# Patient Record
Sex: MALE | Race: WHITE | Employment: OTHER | URBAN - METROPOLITAN AREA
[De-identification: names, ages, dates, MRNs, and addresses within clinical notes are randomized per-mention and may not be internally consistent; named-entity substitution may affect disease eponyms.]

---

## 2020-10-24 ENCOUNTER — HOSPITAL ENCOUNTER (OUTPATIENT)
Facility: CLINIC | Age: 65
Setting detail: OBSERVATION
Discharge: HOME | End: 2020-10-25
Attending: EMERGENCY MEDICINE | Admitting: INTERNAL MEDICINE
Payer: COMMERCIAL

## 2020-10-24 ENCOUNTER — APPOINTMENT (OUTPATIENT)
Dept: CT IMAGING | Facility: CLINIC | Age: 65
End: 2020-10-24
Attending: EMERGENCY MEDICINE
Payer: COMMERCIAL

## 2020-10-24 ENCOUNTER — APPOINTMENT (OUTPATIENT)
Dept: GENERAL RADIOLOGY | Facility: CLINIC | Age: 65
End: 2020-10-24
Attending: EMERGENCY MEDICINE
Payer: COMMERCIAL

## 2020-10-24 DIAGNOSIS — R07.9 CHEST PAIN, UNSPECIFIED TYPE: ICD-10-CM

## 2020-10-24 LAB
ALBUMIN SERPL-MCNC: 3.5 G/DL (ref 3.4–5)
ALP SERPL-CCNC: 64 U/L (ref 40–150)
ALT SERPL W P-5'-P-CCNC: 35 U/L (ref 0–70)
ANION GAP SERPL CALCULATED.3IONS-SCNC: 4 MMOL/L (ref 3–14)
AST SERPL W P-5'-P-CCNC: 22 U/L (ref 0–45)
BASOPHILS # BLD AUTO: 0 10E9/L (ref 0–0.2)
BASOPHILS NFR BLD AUTO: 0.3 %
BILIRUB SERPL-MCNC: 0.5 MG/DL (ref 0.2–1.3)
BUN SERPL-MCNC: 22 MG/DL (ref 7–30)
CALCIUM SERPL-MCNC: 9.1 MG/DL (ref 8.5–10.1)
CHLORIDE SERPL-SCNC: 109 MMOL/L (ref 94–109)
CO2 SERPL-SCNC: 27 MMOL/L (ref 20–32)
CREAT SERPL-MCNC: 1.04 MG/DL (ref 0.66–1.25)
DIFFERENTIAL METHOD BLD: NORMAL
EOSINOPHIL # BLD AUTO: 0 10E9/L (ref 0–0.7)
EOSINOPHIL NFR BLD AUTO: 0.4 %
ERYTHROCYTE [DISTWIDTH] IN BLOOD BY AUTOMATED COUNT: 11.7 % (ref 10–15)
GFR SERPL CREATININE-BSD FRML MDRD: 74 ML/MIN/{1.73_M2}
GLUCOSE SERPL-MCNC: 95 MG/DL (ref 70–99)
HCT VFR BLD AUTO: 45.4 % (ref 40–53)
HGB BLD-MCNC: 14.9 G/DL (ref 13.3–17.7)
IMM GRANULOCYTES # BLD: 0 10E9/L (ref 0–0.4)
IMM GRANULOCYTES NFR BLD: 0.3 %
INTERPRETATION ECG - MUSE: NORMAL
LABORATORY COMMENT REPORT: NORMAL
LIPASE SERPL-CCNC: 91 U/L (ref 73–393)
LYMPHOCYTES # BLD AUTO: 1.1 10E9/L (ref 0.8–5.3)
LYMPHOCYTES NFR BLD AUTO: 15.1 %
MCH RBC QN AUTO: 31.6 PG (ref 26.5–33)
MCHC RBC AUTO-ENTMCNC: 32.8 G/DL (ref 31.5–36.5)
MCV RBC AUTO: 96 FL (ref 78–100)
MONOCYTES # BLD AUTO: 0.5 10E9/L (ref 0–1.3)
MONOCYTES NFR BLD AUTO: 6.9 %
NEUTROPHILS # BLD AUTO: 5.6 10E9/L (ref 1.6–8.3)
NEUTROPHILS NFR BLD AUTO: 77 %
NRBC # BLD AUTO: 0 10*3/UL
NRBC BLD AUTO-RTO: 0 /100
PLATELET # BLD AUTO: 243 10E9/L (ref 150–450)
POTASSIUM SERPL-SCNC: 4.3 MMOL/L (ref 3.4–5.3)
PROT SERPL-MCNC: 7.1 G/DL (ref 6.8–8.8)
RBC # BLD AUTO: 4.72 10E12/L (ref 4.4–5.9)
SARS-COV-2 RNA SPEC QL NAA+PROBE: NEGATIVE
SARS-COV-2 RNA SPEC QL NAA+PROBE: NORMAL
SODIUM SERPL-SCNC: 140 MMOL/L (ref 133–144)
SPECIMEN SOURCE: NORMAL
SPECIMEN SOURCE: NORMAL
TROPONIN I SERPL-MCNC: 0.02 UG/L (ref 0–0.04)
TROPONIN I SERPL-MCNC: 0.02 UG/L (ref 0–0.04)
WBC # BLD AUTO: 7.3 10E9/L (ref 4–11)

## 2020-10-24 PROCEDURE — G0378 HOSPITAL OBSERVATION PER HR: HCPCS

## 2020-10-24 PROCEDURE — 71046 X-RAY EXAM CHEST 2 VIEWS: CPT

## 2020-10-24 PROCEDURE — C9803 HOPD COVID-19 SPEC COLLECT: HCPCS

## 2020-10-24 PROCEDURE — 99220 PR INITIAL OBSERVATION CARE,LEVEL III: CPT | Performed by: PHYSICIAN ASSISTANT

## 2020-10-24 PROCEDURE — 80053 COMPREHEN METABOLIC PANEL: CPT | Performed by: EMERGENCY MEDICINE

## 2020-10-24 PROCEDURE — 250N000013 HC RX MED GY IP 250 OP 250 PS 637: Performed by: EMERGENCY MEDICINE

## 2020-10-24 PROCEDURE — 83690 ASSAY OF LIPASE: CPT | Performed by: EMERGENCY MEDICINE

## 2020-10-24 PROCEDURE — 71260 CT THORAX DX C+: CPT

## 2020-10-24 PROCEDURE — 36415 COLL VENOUS BLD VENIPUNCTURE: CPT | Performed by: PHYSICIAN ASSISTANT

## 2020-10-24 PROCEDURE — U0003 INFECTIOUS AGENT DETECTION BY NUCLEIC ACID (DNA OR RNA); SEVERE ACUTE RESPIRATORY SYNDROME CORONAVIRUS 2 (SARS-COV-2) (CORONAVIRUS DISEASE [COVID-19]), AMPLIFIED PROBE TECHNIQUE, MAKING USE OF HIGH THROUGHPUT TECHNOLOGIES AS DESCRIBED BY CMS-2020-01-R: HCPCS | Performed by: EMERGENCY MEDICINE

## 2020-10-24 PROCEDURE — 99285 EMERGENCY DEPT VISIT HI MDM: CPT | Mod: 25

## 2020-10-24 PROCEDURE — 84484 ASSAY OF TROPONIN QUANT: CPT | Performed by: EMERGENCY MEDICINE

## 2020-10-24 PROCEDURE — 250N000011 HC RX IP 250 OP 636: Performed by: EMERGENCY MEDICINE

## 2020-10-24 PROCEDURE — 85025 COMPLETE CBC W/AUTO DIFF WBC: CPT | Performed by: EMERGENCY MEDICINE

## 2020-10-24 PROCEDURE — 93005 ELECTROCARDIOGRAM TRACING: CPT

## 2020-10-24 RX ORDER — ALUMINA, MAGNESIA, AND SIMETHICONE 2400; 2400; 240 MG/30ML; MG/30ML; MG/30ML
30 SUSPENSION ORAL EVERY 4 HOURS PRN
Status: DISCONTINUED | OUTPATIENT
Start: 2020-10-24 | End: 2020-10-25 | Stop reason: HOSPADM

## 2020-10-24 RX ORDER — OXYCODONE HYDROCHLORIDE 5 MG/1
5 TABLET ORAL EVERY 4 HOURS PRN
Status: DISCONTINUED | OUTPATIENT
Start: 2020-10-24 | End: 2020-10-25 | Stop reason: HOSPADM

## 2020-10-24 RX ORDER — LIDOCAINE 40 MG/G
CREAM TOPICAL
Status: DISCONTINUED | OUTPATIENT
Start: 2020-10-24 | End: 2020-10-25 | Stop reason: HOSPADM

## 2020-10-24 RX ORDER — NITROGLYCERIN 0.4 MG/1
0.4 TABLET SUBLINGUAL EVERY 5 MIN PRN
Status: DISCONTINUED | OUTPATIENT
Start: 2020-10-24 | End: 2020-10-25 | Stop reason: HOSPADM

## 2020-10-24 RX ORDER — IOPAMIDOL 755 MG/ML
80 INJECTION, SOLUTION INTRAVASCULAR ONCE
Status: COMPLETED | OUTPATIENT
Start: 2020-10-24 | End: 2020-10-24

## 2020-10-24 RX ORDER — HYDRALAZINE HYDROCHLORIDE 20 MG/ML
10 INJECTION INTRAMUSCULAR; INTRAVENOUS EVERY 6 HOURS PRN
Status: DISCONTINUED | OUTPATIENT
Start: 2020-10-24 | End: 2020-10-24

## 2020-10-24 RX ORDER — NALOXONE HYDROCHLORIDE 0.4 MG/ML
.1-.4 INJECTION, SOLUTION INTRAMUSCULAR; INTRAVENOUS; SUBCUTANEOUS
Status: DISCONTINUED | OUTPATIENT
Start: 2020-10-24 | End: 2020-10-25 | Stop reason: HOSPADM

## 2020-10-24 RX ORDER — METOPROLOL TARTRATE 1 MG/ML
5 INJECTION, SOLUTION INTRAVENOUS ONCE
Status: DISCONTINUED | OUTPATIENT
Start: 2020-10-24 | End: 2020-10-24

## 2020-10-24 RX ORDER — ASPIRIN 81 MG/1
81 TABLET ORAL DAILY
Status: DISCONTINUED | OUTPATIENT
Start: 2020-10-25 | End: 2020-10-25 | Stop reason: HOSPADM

## 2020-10-24 RX ORDER — NITROGLYCERIN 0.4 MG/1
0.4 TABLET SUBLINGUAL EVERY 5 MIN PRN
Status: DISCONTINUED | OUTPATIENT
Start: 2020-10-24 | End: 2020-10-24

## 2020-10-24 RX ORDER — ACETAMINOPHEN 325 MG/1
650 TABLET ORAL EVERY 4 HOURS PRN
Status: DISCONTINUED | OUTPATIENT
Start: 2020-10-24 | End: 2020-10-25 | Stop reason: HOSPADM

## 2020-10-24 RX ORDER — HYDRALAZINE HYDROCHLORIDE 20 MG/ML
5-10 INJECTION INTRAMUSCULAR; INTRAVENOUS EVERY 6 HOURS PRN
Status: DISCONTINUED | OUTPATIENT
Start: 2020-10-24 | End: 2020-10-25 | Stop reason: HOSPADM

## 2020-10-24 RX ORDER — ASPIRIN 81 MG/1
162 TABLET, CHEWABLE ORAL ONCE
Status: COMPLETED | OUTPATIENT
Start: 2020-10-24 | End: 2020-10-24

## 2020-10-24 RX ADMIN — IOPAMIDOL 80 ML: 755 INJECTION, SOLUTION INTRAVENOUS at 16:17

## 2020-10-24 RX ADMIN — NITROGLYCERIN 0.4 MG: 0.4 TABLET, ORALLY DISINTEGRATING SUBLINGUAL at 15:39

## 2020-10-24 RX ADMIN — ASPIRIN 162 MG: 81 TABLET, CHEWABLE ORAL at 18:57

## 2020-10-24 ASSESSMENT — ENCOUNTER SYMPTOMS
DIAPHORESIS: 0
NAUSEA: 0
VOMITING: 0

## 2020-10-24 NOTE — H&P
[facility]  Observation Unit  H & P      Patient Name: Candido Pierre MRN# [id number]   Age: 65 year old YOB: 1955     Date of Admission:10/24/2020    Primary care provider: Esvin Vazquez  Date of Service: 10/24/2020         Assessment and Plan:   Candido Pierre is a 65 year old male with a history of Heart Murmur, Skin Cancer, BPH, HLD who presented to the ED today 2/2 chest pain.    Chest Pain - pt reports the onset of left sided chest pressure with radiation around to his back around 10am after lifting a 40-50lb deer stand.  No associated shortness of breath or diaphoresis.  Pain now sore and reproducbile with twisting and stretching the left arm.  Troponin detectable on arrival and repeated with no significant change.  EKG non ischemic.  CT Aortic Survey with minimal nonaneurysmal atherosclerosis is seen in the distal abdominal aorta and in the iliac arteries. No evidence for aortic artery aneurysm or dissection. No acute fracture, pleural effusion, pneumothorax, or pneumonia is seen.  Patient admitted for ACS rule out.  He will undergo stress testing on 10/25.  If stress testing negative, consider likely MSK strain.  - serial troponin levels  - telemetry monitoring  - exercise stress echocardiogram 10/25    HTN - bp elevated on arrival with improvement after sl nitroglycerin.  Not on medications pta.  - monitor trend  - prn Hydralazine    Hyperlipidemia - last lipid panel (2/2020) Tchol 214, HDL 43, , Trig 103.  Not currently on medications.    BPH - continue pta Flomax.  Enlarged prostate noted on CT    Inguinal Hernia - incidentally noted on CT imaging.    Cholelithiasis - incidentally noted on CT imaging      CODE: Full  Diet/IVF: cardiac diet without caffeine  DVT ppx: ambulation    Regina Alvarenga MS PA-C  Physician Assistant   Hospitalist Service  Pager: [phone]    Awaiting formal pharmacy med rec to confirm home medications         Chief Complaint:   Left  sided chest pressure         HPI:   65 year old male with a history of Heart Murmur, Skin Cancer, BPH, HLD who presented to the ED today 2/2 chest pain.    Patient reports he was in his usual state of health.  Around 10am this morning, he was lifting a 40-50 pound deer stand over his head.  He felt a left sided pressure with radiation around to his back and immediate burping/gas production.  The burping resolved, but his chest pressure persisted.  He denied any radiation to his jaw or down his arm, shortness of breath or diaphoresis.  Pain is currently improved with sitting still and worse with a twisting movement and stretching his left arm.  He denies any hx of HTN, CAD.  He does have a family hx of CAD in his brother.  He denies cough, fever.    ED work up revealed patient hypertensive, afebrile, 98% on room air.  Laboratory work up revealed CMP, Lipase, Troponin, CBC wnl.  EKG revealed NSR.  CXR Prominence of the descending thoracic aorta which prompted a CT Aortic Survey which revealed Minimal nonaneurysmal atherosclerosis is seen in the distal abdominal aorta and in the iliac arteries. No evidence for aortic artery aneurysm or dissection.   Etiology for patient's chest pain is not definitely seen. No acute fracture is identified. No evidence for pleural effusion, pneumothorax, or pneumonia is seen. Small bilateral fat-containing inguinal hernias, slightly larger on the left.  Enlarged prostate gland.  Cholelithiasis without evidence for acute cholecystitis. Patient received sl nitroglycerin, ASA and admitted for further management.         Past Medical History:   Heart Murmur, Skin Cancer, BPH, HLD        Past Surgical History:   Left index finger amputation  Big toe IP joint fusion  Left eye surgery   Hernia repair  Anal fissure repair       Social History:     Social History     Socioeconomic History     Marital status:      Spouse name: Not on file     Number of children: Not on file     Years of  education: Not on file     Highest education level: Not on file   Occupational History     Not on file   Social Needs     Financial resource strain: Not on file     Food insecurity     Worry: Not on file     Inability: Not on file     Transportation needs     Medical: Not on file     Non-medical: Not on file   Tobacco Use     Smoking status: Not on file   Substance and Sexual Activity     Alcohol use: Not on file     Drug use: Not on file     Sexual activity: Not on file   Lifestyle     Physical activity     Days per week: Not on file     Minutes per session: Not on file     Stress: Not on file   Relationships     Social connections     Talks on phone: Not on file     Gets together: Not on file     Attends Voodoo service: Not on file     Active member of club or organization: Not on file     Attends meetings of clubs or organizations: Not on file     Relationship status: Not on file     Intimate partner violence     Fear of current or ex partner: Not on file     Emotionally abused: Not on file     Physically abused: Not on file     Forced sexual activity: Not on file   Other Topics Concern     Not on file   Social History Narrative     Not on file          Family History:   Pancreatitic cancer  Hypertension  Breast cancer  Thyroid disease       Allergies:    No Known Allergies       Medications:   none       Review of Systems:   A complete ROS was performed and is negative other than what is stated in the HPI.       Physical Exam:   Blood pressure (!) 139/90, pulse 58, temperature 98.8  F (37.1  C), temperature source Oral, resp. rate 15, weight 86.2 kg (190 lb), SpO2 96 %. on room air  General: Alert, interactive, NAD, lying in bed, pleasant and cooperative, watching television  HEENT: AT/NC, sclera anicteric  Chest/Resp: clear to auscultation bilaterally, no crackles or wheezes  Heart/CV: regular rate and rhythm, no murmur  Abdomen/GI: Soft, nontender, nondistended. +BS.  No rebound or  guarding.  Extremities/MSK: No LE edema  Skin: Warm and dry  Neuro: Alert & oriented x 3, no focal deficits, moves all extremities equally         Labs:   ROUTINE ICU LABS (Last four results)  CMP  Recent Labs   Lab 10/24/20  1537      POTASSIUM 4.3   CHLORIDE 109   CO2 27   ANIONGAP 4   GLC 95   BUN 22   CR 1.04   GFRESTIMATED 74   GFRESTBLACK 86   JOANN 9.1   PROTTOTAL 7.1   ALBUMIN 3.5   BILITOTAL 0.5   ALKPHOS 64   AST 22   ALT 35     CBC  Recent Labs   Lab 10/24/20  1537   WBC 7.3   RBC 4.72   HGB 14.9   HCT 45.4   MCV 96   MCH 31.6   MCHC 32.8   RDW 11.7        INRNo lab results found in last 7 days.  Arterial Blood GasNo lab results found in last 7 days.       Imaging/Procedures:     Results for orders placed or performed during the hospital encounter of 10/24/20   XR Chest 2 Views    Narrative    CHEST TWO VIEWS    10/24/2020 4:00 PM     HISTORY: Chest pain    COMPARISON: None.    FINDINGS:  The descending thoracic aorta appears prominent. Ectasia or  dissection is not excluded in the appropriate clinical setting. If  there is clinical concern for dissection, further evaluation with CT  chest would be recommended. Lungs are clear. Heart size, mediastinum  and pulmonary vascularity are otherwise within normal limits. No  pneumothorax, significant pleural fluid collection, or acute osseous  fracture.      Impression    IMPRESSION:   1. Prominence of the descending thoracic aorta could represent a  normal artifact in this case, but could also represent ectasia,  aneurysmal dilatation or dissection in the appropriate clinical  setting. Further evaluation with CT chest is recommended if there is  clinical concern for these etiologies.  2. No other evidence of acute cardiopulmonary disease is seen.    I discussed the prominent aorta with Dr. Ríos on 10/24/2020 at 4:07  PM.   CT Aortic Survey w Contrast    Narrative    CT AORTIC SURVEY WITH CONTRAST  10/24/2020 4:23 PM    HISTORY: Concern for  dissection, chest pain sudden onset.    TECHNIQUE: Scans obtained of the chest, abdomen, and pelvis with IV  contrast. 80mL Isovue-370 injected and images were obtained in the  arterial phase of contrast. Radiation dose for this scan was reduced  using automated exposure control, adjustment of the mA and/or kV  according to patient size, or iterative reconstruction technique.    COMPARISON: Chest x-rays dated 10/24/2020.    FINDINGS: The aorta is of normal caliber and demonstrates no evidence  for dissection or aneurysm. It measures up to 2.8 cm in cross-section  at the proximal descending thoracic portion, 3.6 cm in cross-sectional  dimension in the ascending portion and 2.2 cm in cross-section in the  abdominal portion. Mild atherosclerotic plaquing is seen in the  abdominal aorta and in the iliac arteries.    Chest: The lungs are clear without significant nodule, mass,  infiltrate, effusion, or pneumothorax. Minimal dependent atelectasis  is seen in the posterior right lung.    Heart is normal in size. No mediastinal, hilar, or axillary  lymphadenopathy is identified. There are no filling defects in the  central pulmonary arteries to suggest pulmonary artery embolism. The  study was not optimized for pulmonary artery evaluation. Visualized  portions of the thyroid are unremarkable.    No aggressive osseous lesions or acute osseous fractures are seen.  Hemangioma is noted in the L1 vertebral body on the left (a benign  finding). There is fusion of the anterior aspects of the bilateral SI  joints. Benign bone island is seen in the anterior left ilium adjacent  to the acetabulum. There are degenerative changes in the bilateral  hips.    Abdomen and pelvis:  The liver, gallbladder, pancreas, spleen,  bilateral adrenal glands enhance normally. Prominent left parapelvic  cyst is noted. Cortex of the posterior right kidney is also noted.  Kidneys otherwise enhance normally. No hydronephrosis,  nephrolithiasis,  hydroureter, or ureteral calculus is identified.  Urinary bladder is normal in appearance.    No adenopathy, free fluid or free air is seen in the peritoneal  cavity.    The colon is grossly of normal caliber. No pericolonic inflammatory  change to suggest acute diverticulitis. Multiple diverticula are noted  in the descending and sigmoid colon. Appendix is normal in appearance  and extends inferiorly from the cecum. Small bowel is grossly of  normal caliber and appearance. Stomach has a thickened wall which is  likely due to incomplete distention. Small amount of fluid and air are  seen in the stomach.    There are small fat-containing bilateral inguinal hernias, slightly  larger on the left. Prostate gland is enlarged and contains some  dystrophic calcifications, and is otherwise unremarkable      Impression    IMPRESSION:  1. Minimal nonaneurysmal atherosclerosis is seen in the distal  abdominal aorta and in the iliac arteries. No evidence for aortic  artery aneurysm or dissection.   2. Etiology for patient's chest pain is not definitely seen. No acute  fracture is identified. No evidence for pleural effusion,  pneumothorax, or pneumonia is seen.  3. Small bilateral fat-containing inguinal hernias, slightly larger on  the left.  4. Enlarged prostate gland.  5. Cholelithiasis without evidence for acute cholecystitis.

## 2020-10-24 NOTE — ED NOTES
[facility]  ED Nurse Handoff Report    Candido Pierre is a 65 year old male   ED Chief complaint: Chest Pain  . ED Diagnosis:   Final diagnoses:   None     Allergies: No Known Allergies    Code Status: Full Code  Activity level - Baseline/Home:  Independent. Activity Level - Current:   Stand by Assist. Lift room needed: No. Bariatric: No   Needed: No   Isolation: No. Infection: Not Applicable.     Vital Signs:   Vitals:    10/24/20 1630 10/24/20 1645 10/24/20 1700 10/24/20 1715   BP: (!) 148/88 (!) 144/88 (!) 139/90    Pulse: 55 60 59 58   Resp: 11 16 16 15   Temp:       TempSrc:       SpO2: 96% 96% 96% 96%   Weight:           Cardiac Rhythm:  ,   Cardiac  Cardiac Rhythm: Normal sinus rhythm  Pain level: 0-10 Pain Scale: 2  Patient confused: No. Patient Falls Risk: Yes.   Elimination Status: Has voided   Patient Report - Initial Complaint: Chest Pain. Focused Assessment: Candido Pierre is a 65 year old male with a history of systolic murmur who presents with chest pain. The patient reports around 1000 this morning he developed pain near his heart after lifting a 50 pound deer stand. His pain radiates to his back and is still present in the ED, though it is less severe and he rates it as a 2/10. He denies any associated nausea or vomiting and any diaphoresis prior to the episode. He denies any previous episodes of chest pain and any history of heart issues. Of note, his brother has a history of heart stents.   Tests Performed: Labs, CT Abnormal Results: Abnormal Labs Reviewed - No abnormal labs to display  Treatments provided: see EMAR  Family Comments: Wife at Bedside, [phone], would like to be kept up to date  OBS brochure/video discussed/provided to patient:  Yes  ED Medications:   Medications   nitroGLYcerin (NITROSTAT) sublingual tablet 0.4 mg (0.4 mg Sublingual Given 10/24/20 1539)   iopamidol (ISOVUE-370) solution 80 mL (80 mLs Intravenous Given 10/24/20 1617)   sodium  chloride (PF) 0.9% PF flush 60 mL (60 mLs Intravenous Given 10/24/20 1175)     Drips infusing:  No  For the majority of the shift, the patient's behavior Green. Interventions performed were NA.    Sepsis treatment initiated: No     Patient tested for COVID 19 prior to admission: YES    ED Nurse Name/Phone Number: Gail Arteaga RN,   5:24 PM    RECEIVING UNIT ED HANDOFF REVIEW    Above ED Nurse Handoff Report was reviewed: Yes  Reviewed by: Blanca Saul on October 24, 2020 at 7:21 PM

## 2020-10-24 NOTE — ED PROVIDER NOTES
History     Chief Complaint:  Chest Pain    HPI   Candido Pierre is a 65 year old male with a history of systolic murmur who presents with chest pain. The patient reports around 1000 this morning he developed pain near his heart after lifting a 50 pound deer stand. His pain radiates to his back and is still present in the ED, though it is less severe and he rates it as a 2/10. He denies any associated nausea or vomiting and any diaphoresis prior to the episode. He denies any previous episodes of chest pain and any history of heart issues. Of note, his brother has a history of heart stents.     Allergies:  No Known Drug Allergies     Medications:    The patient is not currently taking any prescribed medications.    Past Medical History:    Basal cell carcinoma  BPH  Dyslipidemia  Systolic murmur     Past Surgical History:    Left index finger amputation  Big toe IP joint fusion  Left eye surgery   Hernia repair  Anal fissure repair    Family History:    Pancreatitic cancer  Hypertension  Breast cancer  Thyroid disease    Social History:  Negative for tobacco use.  Positive for alcohol use.   Negative for drug use.  Marital Status:       Review of Systems   Constitutional: Negative for diaphoresis.   Cardiovascular: Positive for chest pain.   Gastrointestinal: Negative for nausea and vomiting.   All other systems reviewed and are negative.      Physical Exam     Patient Vitals for the past 24 hrs:   BP Temp Temp src Pulse Resp SpO2 Weight   10/24/20 1715 -- -- -- 58 15 96 % --   10/24/20 1700 (!) 139/90 -- -- 59 16 96 % --   10/24/20 1645 (!) 144/88 -- -- 60 16 96 % --   10/24/20 1630 (!) 148/88 -- -- 55 11 96 % --   10/24/20 1545 (!) 159/72 -- -- 71 8 96 % --   10/24/20 1530 (!) 173/104 -- -- -- -- -- --   10/24/20 1437 (!) 209/115 98.8  F (37.1  C) Oral 62 16 98 % 86.2 kg (190 lb)     Physical Exam    Constitutional: Alert  HENT:    Nose: Nose normal.    Mouth/Throat: Oropharynx is clear, mucous membranes  are moist  Eyes: EOM are normal. Pupils are equal, round, and reactive to light.   CV: Regular rate and rhythm, no murmurs, rubs or gallops.  Resp: Clear lungs to auscultation, all lung fields. Normal respiratory effort.   GI: Soft, non-distended. There is no tenderness. No rebound or guarding.   MSK: Normal range of motion. No deformity.   Neurological:   A/Ox3  5/5 strength is symmetric to the upper and lower extremities;   Sensation intact to light touch throughout the upper and lower extremities;   Skin: Skin is warm and dry.     Emergency Department Course     ECG:  ECG taken at 1446, ECG read at 1448  Normal sinus rhythm.  Minimal voltage criteria for LVH, may be normal variant.   Borderline ECG.  No ST elevation or depression   No T wave inversion   No evidence of HOCM, ARVD, Brugada, WPW, AV block, prolonged QT.  Rate 63 bpm. OK interval 176 ms. QRS duration 100 ms. QT/QTc 402/411 ms. P-R-T axes 15 10 32.      Imaging:  Radiology findings were communicated with the patient who voiced understanding of the findings.    XR Chest 2 Views:  1. Prominence of the descending thoracic aorta could represent a  normal artifact in this case, but could also represent ectasia,  aneurysmal dilatation or dissection in the appropriate clinical  setting. Further evaluation with CT chest is recommended if there is  clinical concern for these etiologies.  2. No other evidence of acute cardiopulmonary disease is seen.  As read by Radiology.     CT Aortic Survey w contrast   1. Minimal nonaneurysmal atherosclerosis is seen in the distal  abdominal aorta and in the iliac arteries. No evidence for aortic  artery aneurysm or dissection.   2. Etiology for patient's chest pain is not definitely seen. No acute  fracture is identified. No evidence for pleural effusion,  pneumothorax, or pneumonia is seen.  3. Small bilateral fat-containing inguinal hernias, slightly larger on  the left.  4. Enlarged prostate gland.  5. Cholelithiasis  without evidence for acute cholecystitis.  Reading per radiology     Laboratory:  Laboratory findings were communicated with the patient who voiced understanding of the findings.    CBC: WNL (WBC 7.3, HGB 14.9, )  CMP: WNL (Creatinine: 1.04)    Troponin (Collected 1537): 0.016   Troponin: 0.019   Lipase: 91     Asymptomatic COVID-19 Virus by PCR: pending      Interventions:  1539 Nitrostat 0.4 mg Sublingual     Emergency Department Course:  Past medical records, nursing notes, and vitals reviewed.    1448 EKG obtained in the ED, see results above.     1510 I performed an exam of the patient as documented above.     1537 IV was inserted and blood was drawn for laboratory testing, results above.    1600 The patient was sent for a XR chest and CT aortic survey while in the emergency department, results above.     1607 I spoke to Dr. Lyman of radiology regarding the patient's case     1630 I rechecked the patient and discussed the results of the ED workup thus far.     1716 I rechecked the patient. Explained findings to patient.     1731 I spoke to Regina Alvarenga PA-C of the hospitalist service who accepts care of the patient.      Findings and plan explained to the Patient who consents to admission. Discussed the patient with Dr. Lowery, who will admit the patient to an observation bed for further monitoring, evaluation, and treatment.    Impression & Plan     Covid-19  Candido Pierre was evaluated during a global COVID-19 pandemic, which necessitated consideration that the patient might be at risk for infection with the SARS-CoV-2 virus that causes COVID-19.   Applicable protocols for evaluation were followed during the patient's care.   COVID-19 was considered as part of the patient's evaluation. The plan for testing is:  a test was obtained during this visit.       Medical Decision Makin-year-old male who comes in with chest pain.  Exam as above.  Labs and EKG and chest x-ray were obtained.  Chest  x-ray showed prominence of the aorta.  CT aorta was obtained which showed a normal aorta.  EKG was nonischemic.  Troponin was not 0 but was also not positive.  Repeat troponin was flat.  Patient will come into the hospital for serial enzymes, EKGs, and stress testing in the morning.    Diagnosis:    ICD-10-CM    1. Chest pain, unspecified type  R07.9 Troponin I (now)     Disposition:  Admitted to Dr. Lowery.    Scribe Disclosure:  I, Brittany Richter, am serving as a scribe at 3:07 PM on 10/24/2020 to document services personally performed by Shawn Ríos DO based on my observations and the provider's statements to me.        Shawn Ríos DO  10/24/20 [id number]

## 2020-10-24 NOTE — ED TRIAGE NOTES
"Patient reports he was lifting a 50# deer stand this morning at about 10 am, developed left chest pain \"pressure\" afterward. It now feels \"like a strain\", worse with deep breath and movement. He also reports burping. No change with walking up steps. Denies nausea, vomiting, radiation of pain, numbness or tingling.   "

## 2020-10-25 ENCOUNTER — APPOINTMENT (OUTPATIENT)
Dept: CARDIOLOGY | Facility: CLINIC | Age: 65
End: 2020-10-25
Attending: PHYSICIAN ASSISTANT
Payer: COMMERCIAL

## 2020-10-25 VITALS
HEART RATE: 64 BPM | DIASTOLIC BLOOD PRESSURE: 95 MMHG | TEMPERATURE: 98.1 F | SYSTOLIC BLOOD PRESSURE: 140 MMHG | WEIGHT: 190 LBS | OXYGEN SATURATION: 96 % | RESPIRATION RATE: 16 BRPM

## 2020-10-25 LAB
TROPONIN I SERPL-MCNC: <0.015 UG/L (ref 0–0.04)
TROPONIN I SERPL-MCNC: <0.015 UG/L (ref 0–0.04)

## 2020-10-25 PROCEDURE — 36415 COLL VENOUS BLD VENIPUNCTURE: CPT | Performed by: PHYSICIAN ASSISTANT

## 2020-10-25 PROCEDURE — 93017 CV STRESS TEST TRACING ONLY: CPT

## 2020-10-25 PROCEDURE — G0008 ADMIN INFLUENZA VIRUS VAC: HCPCS | Performed by: PHYSICIAN ASSISTANT

## 2020-10-25 PROCEDURE — 93018 CV STRESS TEST I&R ONLY: CPT | Performed by: INTERNAL MEDICINE

## 2020-10-25 PROCEDURE — 250N000011 HC RX IP 250 OP 636: Performed by: PHYSICIAN ASSISTANT

## 2020-10-25 PROCEDURE — 250N000013 HC RX MED GY IP 250 OP 250 PS 637: Performed by: PHYSICIAN ASSISTANT

## 2020-10-25 PROCEDURE — 90662 IIV NO PRSV INCREASED AG IM: CPT | Performed by: PHYSICIAN ASSISTANT

## 2020-10-25 PROCEDURE — 93321 DOPPLER ECHO F-UP/LMTD STD: CPT | Mod: 26 | Performed by: INTERNAL MEDICINE

## 2020-10-25 PROCEDURE — 93016 CV STRESS TEST SUPVJ ONLY: CPT | Performed by: INTERNAL MEDICINE

## 2020-10-25 PROCEDURE — 93325 DOPPLER ECHO COLOR FLOW MAPG: CPT | Mod: 26 | Performed by: INTERNAL MEDICINE

## 2020-10-25 PROCEDURE — G0378 HOSPITAL OBSERVATION PER HR: HCPCS

## 2020-10-25 PROCEDURE — 99217 PR OBSERVATION CARE DISCHARGE: CPT | Performed by: PHYSICIAN ASSISTANT

## 2020-10-25 PROCEDURE — 84484 ASSAY OF TROPONIN QUANT: CPT | Performed by: PHYSICIAN ASSISTANT

## 2020-10-25 PROCEDURE — 93350 STRESS TTE ONLY: CPT | Mod: 26 | Performed by: INTERNAL MEDICINE

## 2020-10-25 RX ADMIN — INFLUENZA A VIRUS A/MICHIGAN/45/2015 X-275 (H1N1) ANTIGEN (FORMALDEHYDE INACTIVATED), INFLUENZA A VIRUS A/SINGAPORE/INFIMH-16-0019/2016 IVR-186 (H3N2) ANTIGEN (FORMALDEHYDE INACTIVATED), INFLUENZA B VIRUS B/PHUKET/3073/2013 ANTIGEN (FORMALDEHYDE INACTIVATED), AND INFLUENZA B VIRUS B/MARYLAND/15/2016 BX-69A ANTIGEN (FORMALDEHYDE INACTIVATED) 0.7 ML: 60; 60; 60; 60 INJECTION, SUSPENSION INTRAMUSCULAR at 10:38

## 2020-10-25 RX ADMIN — ASPIRIN 81 MG: 81 TABLET, COATED ORAL at 08:17

## 2020-10-25 NOTE — PLAN OF CARE
PRIMARY DIAGNOSIS: CHEST PAIN  OUTPATIENT/OBSERVATION GOALS TO BE MET BEFORE DISCHARGE:  1. Ruled out acute coronary syndrome (negative or stable Troponin):   Pending serial Troponin  2. Pain Status: Pain free.  3. Appropriate provocative testing performed: No  - Stress Test Procedure: Echo Stress in am  - Interpretation of cardiac rhythm per telemetry tech: SR, HR 70s    4. Cleared by Consultants (if applicable):N/A  5. Return to near baseline physical activity: Yes  Discharge Planner Nurse   Safe discharge environment identified: Yes  Barriers to discharge: Yes , until medically cleared       Entered by: Blanca Saul 10/24/2020 8:53 PM     Vital signs:  Temp: 99.6  F (37.6  C) Temp src: Oral BP: (!) 150/89 Pulse: 64   Resp: 16 SpO2: 98 % O2 Device: None (Room air)     Weight: 86.2 kg (190 lb)  Pt AOx4.  Independent.  Denies pain.  Low Sat Fat/<2400mg Na/No Caffefine diet, clear liquids at midnight. SL.  Covid pending.  Serial troponins to be done 0000 & 0600.  PRN hydralazine SBP>180. Echo Stress sched in am.  Continue monitor on tele, provide cares.         Please review provider order for any additional goals.   Nurse to notify provider when observation goals have been met and patient is ready for discharge.

## 2020-10-25 NOTE — PLAN OF CARE
PRIMARY DIAGNOSIS: CHEST PAIN  OUTPATIENT/OBSERVATION GOALS TO BE MET BEFORE DISCHARGE:  1. Ruled out acute coronary syndrome (negative or stable Troponin):  Yes: 0.016 / 0.019/<0.015  2. Pain Status: Denies  3. Appropriate provocative testing performed: stress test in AM  - Stress Test Procedure: Exercise Stress Echo  - Interpretation of cardiac rhythm per telemetry tech: SR/SB 59 w/ PVCs    4. Cleared by Consultants (if applicable): N/A  5. Return to near baseline physical activity: Yes  Discharge Planner Nurse   Safe discharge environment identified: Yes  Barriers to discharge: Yes       Entered by: Keke Lieberman 10/25/2020      Please review provider order for any additional goals.   Nurse to notify provider when observation goals have been met and patient is ready for discharge.

## 2020-10-25 NOTE — PLAN OF CARE
PRIMARY DIAGNOSIS: CHEST PAIN  OUTPATIENT/OBSERVATION GOALS TO BE MET BEFORE DISCHARGE:  1. Ruled out acute coronary syndrome (negative or stable Troponin):  Yes: 0.016 / 0.019  2. Pain Status: 1/10 L posterior shoulder pain  3. Appropriate provocative testing performed: stress test in AM  - Stress Test Procedure: Exercise Stress Echo  - Interpretation of cardiac rhythm per telemetry tech: SR/SB 59 w/ PVCs    4. Cleared by Consultants (if applicable): N/A  5. Return to near baseline physical activity: Yes  Discharge Planner Nurse   Safe discharge environment identified: Yes  Barriers to discharge: Yes       Entered by: Keke Lieberman 10/25/2020      Please review provider order for any additional goals.   Nurse to notify provider when observation goals have been met and patient is ready for discharge.

## 2020-10-25 NOTE — PLAN OF CARE
PRIMARY DIAGNOSIS: CHEST PAIN  OUTPATIENT/OBSERVATION GOALS TO BE MET BEFORE DISCHARGE:  1. Ruled out acute coronary syndrome (negative or stable Troponin):  Yes  2. Pain Status: Pain free.  3. Appropriate provocative testing performed: Yes  - Stress Test Procedure: Echo  - Interpretation of cardiac rhythm per telemetry tech: SB PVCs    4. Cleared by Consultants (if applicable):N/A  5. Return to near baseline physical activity: Yes  Discharge Planner Nurse   Safe discharge environment identified: Yes  Barriers to discharge: Yes, reading on stress echo       Entered by: Jazzmine Gregory 10/25/2020 8:29 AM     Please review provider order for any additional goals.   Nurse to notify provider when observation goals have been met and patient is ready for discharge.

## 2020-10-25 NOTE — PROGRESS NOTES
Patient discharged home via private car.  Patient verbalized and received copy of discharge instructions.  Personal belongings gathered and sent with patient.  VSS. IV removed prior to discharge.

## 2020-10-25 NOTE — DISCHARGE SUMMARY
Discharge Summary  Hospitalist Service    Candido Pierre MRN# [id number]   YOB: 1955 Age: 65 year old     Date of Admission:  10/24/2020  Date of Discharge:  10/25/2020  Admitting Physician: Michael Lowery MD  Discharge Physician: Carla Leon PA-C  Discharging Service: Hospitalist Service     Primary Provider: Esvin Vazquez  Primary Care Physician Phone Number: [phone]         Discharge Diagnoses/Problem Oriented Hospital Course (Providers):    Candido Pierre was admitted on 10/24/2020 by Michael Lowery MD and I would refer you to their history and physical. Briefly, patient was admitted with complaints of left sided chest pain after trying to lift a heavy deer stand. This was not associated with shortness of breath, nausea, lightheadedness or diaphoresis. His ECG was non ischemic and his cardiac enzymes were negative for heart attack. We did a stress echocardiogram that was negative for heart disease. Possibly, his symptoms could be related to esophageal spasm or musculoskeletal?    His blood pressure was elevated and he was offered anti hypertensive medicine which he deferred in order to talk to PCP. He was also offered a cholesterol medicine which he declined.          Code Status:      Full Code        Brief Hospital Stay Summary Sent Home With Patient in AVS:        Reason for your hospital stay      You were admitted for concerns of chest pain. On admission your ECG was   nonischemic and your cardiac enzymes were negative for heart attack. We   monitored your heart overnight with no abnormal findings. You did a stress   echocardiogram this morning and it was negative for coronary disease. We   do not believe your chest pain was related to your heart, possibly   musculoskeletal or esophageal spasm.    We did notice that your blood pressure was high and recommended a blood   pressure medicine but you prefer to talk to your primary care doctor about   this (please call  and make appt soon). You should also consider a   cholesterol medicine.                           Pending Results:        Unresulted Labs Ordered in the Past 30 Days of this Admission     No orders found for last 31 day(s).            Discharge Instructions and Follow-Up:      Follow-up Appointments     Follow-up and recommended labs and tests       Follow up with primary care provider, Esvin Vazquez, within 7-14 days   for hospital follow- up.  No follow up labs or test are needed.               Discharge Disposition:      Discharged to home         Discharge Medications:      There are no discharge medications for this patient.        Allergies:       No Known Allergies        Consultations This Hospital Stay:      No consultations were requested during this admission         Condition and Physical on Discharge:      Discharge condition: Stable   Vitals: Blood pressure (!) 140/95, pulse 64, temperature 98.1  F (36.7  C), temperature source Oral, resp. rate 16, weight 86.2 kg (190 lb), SpO2 96 %.     Constitutional: Alert and orientated x 3   Lungs: CTAB   Cardiovascular: RRR with no murmur   Abdomen: Bowel sounds are present with no tenderness    Skin: No rash or open sores   Other:          Discharge Time:      Less than 30 minutes.        Image Results From This Hospital Stay (For Non-EPIC Providers):        Results for orders placed or performed during the hospital encounter of 10/24/20   XR Chest 2 Views    Narrative    CHEST TWO VIEWS    10/24/2020 4:00 PM     HISTORY: Chest pain    COMPARISON: None.    FINDINGS:  The descending thoracic aorta appears prominent. Ectasia or  dissection is not excluded in the appropriate clinical setting. If  there is clinical concern for dissection, further evaluation with CT  chest would be recommended. Lungs are clear. Heart size, mediastinum  and pulmonary vascularity are otherwise within normal limits. No  pneumothorax, significant pleural fluid collection, or acute  osseous  fracture.      Impression    IMPRESSION:   1. Prominence of the descending thoracic aorta could represent a  normal artifact in this case, but could also represent ectasia,  aneurysmal dilatation or dissection in the appropriate clinical  setting. Further evaluation with CT chest is recommended if there is  clinical concern for these etiologies.  2. No other evidence of acute cardiopulmonary disease is seen.    I discussed the prominent aorta with Dr. Ríos on 10/24/2020 at 4:07  PM.    JORGITO LAU MD   CT Aortic Survey w Contrast    Narrative    CT AORTIC SURVEY WITH CONTRAST  10/24/2020 4:23 PM    HISTORY: Concern for dissection, chest pain sudden onset.    TECHNIQUE: Scans obtained of the chest, abdomen, and pelvis with IV  contrast. 80mL [id number] injected and images were obtained in the  arterial phase of contrast. Radiation dose for this scan was reduced  using automated exposure control, adjustment of the mA and/or kV  according to patient size, or iterative reconstruction technique.    COMPARISON: Chest x-rays dated 10/24/2020.    FINDINGS: The aorta is of normal caliber and demonstrates no evidence  for dissection or aneurysm. It measures up to 2.8 cm in cross-section  at the proximal descending thoracic portion, 3.6 cm in cross-sectional  dimension in the ascending portion and 2.2 cm in cross-section in the  abdominal portion. Mild atherosclerotic plaquing is seen in the  abdominal aorta and in the iliac arteries.    Chest: The lungs are clear without significant nodule, mass,  infiltrate, effusion, or pneumothorax. Minimal dependent atelectasis  is seen in the posterior right lung.    Heart is normal in size. No mediastinal, hilar, or axillary  lymphadenopathy is identified. There are no filling defects in the  central pulmonary arteries to suggest pulmonary artery embolism. The  study was not optimized for pulmonary artery evaluation. Visualized  portions of the thyroid are  unremarkable.    No aggressive osseous lesions or acute osseous fractures are seen.  Hemangioma is noted in the L1 vertebral body on the left (a benign  finding). There is fusion of the anterior aspects of the bilateral SI  joints. Benign bone island is seen in the anterior left ilium adjacent  to the acetabulum. There are degenerative changes in the bilateral  hips.    Abdomen and pelvis:  The liver, gallbladder, pancreas, spleen,  bilateral adrenal glands enhance normally. Prominent left parapelvic  cyst is noted. Cortex of the posterior right kidney is also noted.  Kidneys otherwise enhance normally. No hydronephrosis,  nephrolithiasis, hydroureter, or ureteral calculus is identified.  Urinary bladder is normal in appearance.    No adenopathy, free fluid or free air is seen in the peritoneal  cavity.    The colon is grossly of normal caliber. No pericolonic inflammatory  change to suggest acute diverticulitis. Multiple diverticula are noted  in the descending and sigmoid colon. Appendix is normal in appearance  and extends inferiorly from the cecum. Small bowel is grossly of  normal caliber and appearance. Stomach has a thickened wall which is  likely due to incomplete distention. Small amount of fluid and air are  seen in the stomach.    There are small fat-containing bilateral inguinal hernias, slightly  larger on the left. Prostate gland is enlarged and contains some  dystrophic calcifications, and is otherwise unremarkable      Impression    IMPRESSION:  1. Minimal nonaneurysmal atherosclerosis is seen in the distal  abdominal aorta and in the iliac arteries. No evidence for aortic  artery aneurysm or dissection.   2. Etiology for patient's chest pain is not definitely seen. No acute  fracture is identified. No evidence for pleural effusion,  pneumothorax, or pneumonia is seen.  3. Small bilateral fat-containing inguinal hernias, slightly larger on  the left.  4. Enlarged prostate gland.  5. Cholelithiasis  without evidence for acute cholecystitis.    JORGITO LAU MD   Echo Stress Echocardiogram    Narrative    [id number]  [id number]  [id number]  [id number]^JAVY^S           [facility]  Echocardiography Laboratory  [address] [place] [address]        Name: BEN FLORES  MRN: [id number]  : 1955  Study Date: 10/25/2020 06:57 AM  Age: 65 yrs  Gender: Male  Patient Location: [facility]  Reason For Study: Chest Pain  Ordering Physician: JAVY CYR  Referring Physician: ROSEMARIE DUMONT  Performed By: Monica García     BSA: 2.1 m2  Height: 71 in  Weight: 190 lb  HR: 66  BP: 144/100 mmHg  _____________________________________________________________________________  __        Procedure  Stress Echo Complete. Contrast Optison.  _____________________________________________________________________________  __        Interpretation Summary  The Duke treadmill score was low risk ( >5 Duke score).  There was no chest pain or significant ST changes with exercise. Patient had  1/10 CP prior to stress that did not increase through exercise and remained  constant.  This was a normal stress echocardiogram  _____________________________________________________________________________  __     Stress  The patient exercised 10:00.  RPP 28,712.  Exercise was stopped due to fatigue.  There was a borderline hypertensive BP response to exercise.  Target Heart Rate was achieved.  A high workload was achieved.  The Duke treadmill score was low risk ( >5 Duke score).  There was no chest pain or significant ST changes with exercise.  This was a normal stress echocardiogram with no evidence of stress-induced  ischemia.  The visual ejection fraction is estimated at 65-70%.     Baseline  The patient is in normal sinus rhythm.  Normal left ventricular function and wall motion at rest and post-stress.     Stress Results             Protocol:  Chavo          Maximum Predicted HR:   154 bpm             Target HR:  131 bpm        % Maximum Predicted HR: 96 %                        Stage  DurationHeart Rate  BP    Comment                             (mm:ss)   (bpm)                    Stage 1   3:00      93    170/96                    Stage 2   3:00      112   184/96                    Stage 3   3:00      134   196/96                    Stage 4   1:00      148      /                   RecoveryR  5:00      88    140/90FAC: Above               Stress Duration:   10:00 mm:ss *        Recovery Time: 5:00 mm:ss         Maximum Stress HR: 148 bpm *            METS:          11     Left Ventricle  The left ventricle is normal in size. The visual ejection fraction is  estimated at 55-60%. Normal left ventricular wall motion.        Right Ventricle  The right ventricle is normal in size and function.     Mitral Valve  There is trace mitral regurgitation.     Aortic Valve  No aortic regurgitation is present. No aortic stenosis is present.     Pericardium  There is no pericardial effusion.     _____________________________________________________________________________  __                             Report approved by: Avery Burnett 10/25/2020 09:26 AM                    _____________________________________________________________________________  __              Most Recent Lab Results In EPIC (For Non-EPIC Providers):    Most Recent 3 CBC's:  Recent Labs   Lab Test 10/24/20  1537 10/01/14  0835   WBC 7.3 8.4   HGB 14.9 14.5   MCV 96 91    209      Most Recent 3 BMP's:  Recent Labs   Lab Test 10/24/20  1537      POTASSIUM 4.3   CHLORIDE 109   CO2 27   BUN 22   CR 1.04   ANIONGAP 4   JOANN 9.1   GLC 95     Most Recent 3 Troponin's:No lab results found.  Most Recent 3 INR's:No lab results found.  Most Recent 2 LFT's:  Recent Labs   Lab Test 10/24/20  1537   AST 22   ALT 35   ALKPHOS 64   BILITOTAL 0.5     Most Recent Cholesterol Panel:No lab results found.  Most Recent 6 Bacteria Isolates From Any Culture (See EPIC  Reports for Culture Details):No lab results found.  Most Recent TSH, T4 and HgbA1c: No lab results found.

## 2020-10-25 NOTE — PHARMACY-ADMISSION MEDICATION HISTORY
Admission medication history interview status for this patient is complete. See [facility] admission navigator for allergy information, prior to admission medications and immunization status.     Medication history interview done via telephone during Covid-19 pandemic, indicate source(s): Patient  Medication history resources (including written lists, pill bottles, clinic record):None  Pharmacy: [facility]    Changes made to PTA medication list:  Added: none  Deleted: calcium carbonate, zinc (not taking)  Changed: none    Actions taken by pharmacist (provider contacted, etc):None     Additional medication history information:None    Medication reconciliation/reorder completed by provider prior to medication history?  N    Prior to Admission medications    Not on File

## 2020-10-25 NOTE — PLAN OF CARE
ROOM # 230    Living Situation (if not independent, order SW consult): Home w/ spouse  Facility name:   : Jillian (wife) [phone]     Activity level at baseline: Independent  Activity level on admit: Independent      Patient registered to observation; given Patient Bill of Rights; given the opportunity to ask questions about observation status and their plan of care.  Patient has been oriented to the observation room, bathroom and call light is in place.    Discussed discharge goals and expectations with patient/family.